# Patient Record
Sex: MALE | Race: WHITE | NOT HISPANIC OR LATINO
[De-identification: names, ages, dates, MRNs, and addresses within clinical notes are randomized per-mention and may not be internally consistent; named-entity substitution may affect disease eponyms.]

---

## 2019-03-21 ENCOUNTER — APPOINTMENT (OUTPATIENT)
Dept: PULMONOLOGY | Facility: CLINIC | Age: 84
End: 2019-03-21
Payer: MEDICARE

## 2019-03-21 VITALS
SYSTOLIC BLOOD PRESSURE: 120 MMHG | WEIGHT: 151 LBS | RESPIRATION RATE: 17 BRPM | HEART RATE: 70 BPM | BODY MASS INDEX: 23.7 KG/M2 | HEIGHT: 67 IN | OXYGEN SATURATION: 98 % | DIASTOLIC BLOOD PRESSURE: 83 MMHG

## 2019-03-21 DIAGNOSIS — R06.02 SHORTNESS OF BREATH: ICD-10-CM

## 2019-03-21 DIAGNOSIS — Z01.811 ENCOUNTER FOR PREPROCEDURAL RESPIRATORY EXAMINATION: ICD-10-CM

## 2019-03-21 DIAGNOSIS — R93.89 ABNORMAL FINDINGS ON DIAGNOSTIC IMAGING OF OTHER SPECIFIED BODY STRUCTURES: ICD-10-CM

## 2019-03-21 PROCEDURE — 71046 X-RAY EXAM CHEST 2 VIEWS: CPT

## 2019-03-21 PROCEDURE — 94060 EVALUATION OF WHEEZING: CPT | Mod: 59

## 2019-03-21 PROCEDURE — 94729 DIFFUSING CAPACITY: CPT

## 2019-03-21 PROCEDURE — ZZZZZ: CPT

## 2019-03-21 PROCEDURE — 94618 PULMONARY STRESS TESTING: CPT

## 2019-03-21 PROCEDURE — 94727 GAS DIL/WSHOT DETER LNG VOL: CPT

## 2019-03-21 PROCEDURE — 99204 OFFICE O/P NEW MOD 45 MIN: CPT | Mod: 25

## 2019-03-21 NOTE — REASON FOR VISIT
[Initial Evaluation] : an initial evaluation [Family Member] : family member [FreeTextEntry1] : initial pulmonary evaluation

## 2019-03-21 NOTE — PHYSICAL EXAM
[Normal Appearance] : normal appearance [General Appearance - Well Developed] : well developed [Well Groomed] : well groomed [General Appearance - Well Nourished] : well nourished [No Deformities] : no deformities [General Appearance - In No Acute Distress] : no acute distress [Normal Conjunctiva] : the conjunctiva exhibited no abnormalities [Eyelids - No Xanthelasma] : the eyelids demonstrated no xanthelasmas [Neck Appearance] : the appearance of the neck was normal [Normal Oropharynx] : normal oropharynx [Neck Cervical Mass (___cm)] : no neck mass was observed [Jugular Venous Distention Increased] : there was no jugular-venous distention [Thyroid Diffuse Enlargement] : the thyroid was not enlarged [Heart Rate And Rhythm] : heart rate and rhythm were normal [Thyroid Nodule] : there were no palpable thyroid nodules [Heart Sounds] : normal S1 and S2 [Murmurs] : no murmurs present [Respiration, Rhythm And Depth] : normal respiratory rhythm and effort [Exaggerated Use Of Accessory Muscles For Inspiration] : no accessory muscle use [Auscultation Breath Sounds / Voice Sounds] : lungs were clear to auscultation bilaterally [Abdomen Soft] : soft [Abdomen Tenderness] : non-tender [Abdomen Mass (___ Cm)] : no abdominal mass palpated [Abnormal Walk] : normal gait [Gait - Sufficient For Exercise Testing] : the gait was sufficient for exercise testing [Nail Clubbing] : no clubbing of the fingernails [Cyanosis, Localized] : no localized cyanosis [Petechial Hemorrhages (___cm)] : no petechial hemorrhages [Skin Color & Pigmentation] : normal skin color and pigmentation [Skin Turgor] : normal skin turgor [] : no rash [Deep Tendon Reflexes (DTR)] : deep tendon reflexes were 2+ and symmetric [No Focal Deficits] : no focal deficits [Sensation] : the sensory exam was normal to light touch and pinprick [Oriented To Time, Place, And Person] : oriented to person, place, and time [Impaired Insight] : insight and judgment were intact [Affect] : the affect was normal [II] : II [FreeTextEntry1] : I:E ratio 1:3; clear

## 2019-03-21 NOTE — PROCEDURE
[FreeTextEntry1] : Full PFT revealed mild obstructive dysfunction, normal flows, with a FEV1 of 2.92 ,which is 109% of predicted, with 11% improvement with use of bronchodilator, normal lung volumes, and a normal diffusion of 22.7, which is 149% of predicted with a normal flow volume loop \par \par 6 minute walk test reveals a low oxygen saturation of 95 % with no evidence of dyspnea or fatigue; He walked 391.2 meters\par \par CXR reveals a normal sized heart; no evidence of infiltrate or effusion--a normal appearing chest radiograph\par  \par Chest CT (3.16.19) revealed no acute PE. Spiculated left upper lobe mass is noted measuring 2.3 x 2.4 cm on image 46 in the left upper lobe which is most concerning for cancer. in addition there are multiple liver lesions identified with upper abdominal lymphadenopathy and what appears to be a pancreatic lesion. Rule out malignancy \par \par CXR reveals a normal sized heart; no evidence of infiltrate or effusion-- LIZZIE approx. 2 cm nodule \par \par CBC :\par WBC 8.01\par HgB 11.2\par HCT 35.8\par \par CMP alkaline phosphatase 5.3\par albumin 4.1\par SGPT 62\par SGOT 45\par D-Dimer 1.64

## 2019-03-21 NOTE — ADDENDUM
[FreeTextEntry1] : All medical record entries made by ashlyn Mcduffie were at Dr. Shon Allen's, direction and personally dictated by me on 03/21/2019. I have reviewed the chart and agree that the record accurately reflects my personal performance of the history, physical exam, assessment and plan. I have also personally directed, reviewed, and agree with the discharge instructions.

## 2019-03-21 NOTE — HISTORY OF PRESENT ILLNESS
[FreeTextEntry1] : Mr. Ruff is a 83 year old male presenting to the office today for an initial pulmonary evaluation. His chief complaint is chest pain / abnormal chest CT. \par -He states that he has generally been feeling well until about 6 days ago on Saturday (3/16) when he began to have a left chest pain\par -he was taken to [facility] where he had a CXR and Chest Ct, revealing a left upper lung abnormality \par -he reports that he has been having difficulty sleeping, as he wakes up about once every hour to urinate\par -he notes that he does not typically snore\par -his memory and concentration are good\par -he states that he would not be able to fall asleep while watching a boring TV show, or as a passenger in a car for over one hour \par -he states that his bowels have been regular\par -he reports that his sense of smell has been good. he adds that his sense of taste is poor, and he has had no interest in eating. he has lost about 10 pounds in the past month\par -he reports that he has some occasional coughing, and last week during the episode of his chest pain he was having some shortness of breath. \par -he is currently having some back and side pains\par -he notes that his sinuses are generally under control\par -he reports that he has had pain in his scrotum for about 6-12 months. his PCP told him that it was just age related, and to take Advil\par -he states that he can still feel some chest pain when he takes a deep breath\par -he denies any headaches, nausea, vomiting, fever, chills, sweats, chest pressure, diarrhea, constipation, dysphagia, dizziness, leg swelling, leg pain, itchy eyes, itchy ears, heartburn, reflux, or sour taste in the mouth, hoarseness.

## 2019-03-21 NOTE — ASSESSMENT
[FreeTextEntry1] : Mr. WYNN is a 83 year old male with a history of BPH, renal insufficiency, occupational exposure at workplace who comes into today for pulmonary evaluation following hospitalization at [facility] and abnormality in chest CT.\par \par Chest  pain is most consistently musculoskeletal issue. Most likely not cardiac or pulmonary related. \par \par problem 1: abnormal chest CT (3 abnormalities)\par 1. LIZZIE nodule\par 2. liver "spots" \par 3. ?lymph nodes in abdomen (coupled with elevated LFTs)\par \par -likely c/w malignant Lung process\par -PET CT pending (will use PET CT as roadmap for next steps)\par -get Thoracic Surgery evaluation : Dr. Curt Conroy (3.25.19) \par \par -CAT scans are the only radiological modality to identify abnormalities w/in the lungs with regards to nodules/masses/lymph nodes. Risks, benefits were reviewed in detail. The guidelines for abnormalities include follow up CT scans at various intervals which could range from 6 weeks to 1 year intervals. If there is a change for the worse then consideration for a biopsy will be considered if you are a candidate. Second opinion evaluation with a thoracic surgeon or an interventional radiologist could be offered. \par \par problem 2: preoperative pulmonary clearance\par -at this point in time there are no absolute pulmonary contraindications to go forward with the planned procedure \par -at the time of surgery s/he should have optimal pain control, incentive spirometry, early ambulation, DVT and GI prophylaxis. \par \par problem 3: poor sleep\par -no pulmonary related therapy at current time\par \par problem 4: health maintenance \par -recommended yearly flu shot - 2018\par -recommended strep pneumonia vaccines: Prevnar-13 vaccine, followed by Pneumo vaccine 23 one year following\par -recommended early intervention for Upper Respiratory Infections (URIs)\par -recommended regular osteoporosis evaluations\par -recommended early dermatological evaluations\par -recommended after the age of 50 to the age of 70, colonoscopy every 5 years \par \par F/U in 6-8 weeks \par He She is encouraged to call with any changes, concerns, or questions

## 2019-03-23 ENCOUNTER — FORM ENCOUNTER (OUTPATIENT)
Age: 84
End: 2019-03-23

## 2019-03-24 ENCOUNTER — APPOINTMENT (OUTPATIENT)
Dept: NUCLEAR MEDICINE | Facility: CLINIC | Age: 84
End: 2019-03-24
Payer: MEDICARE

## 2019-03-24 ENCOUNTER — OUTPATIENT (OUTPATIENT)
Dept: OUTPATIENT SERVICES | Facility: HOSPITAL | Age: 84
LOS: 1 days | End: 2019-03-24
Payer: COMMERCIAL

## 2019-03-24 DIAGNOSIS — R93.89 ABNORMAL FINDINGS ON DIAGNOSTIC IMAGING OF OTHER SPECIFIED BODY STRUCTURES: ICD-10-CM

## 2019-03-24 PROCEDURE — 78815 PET IMAGE W/CT SKULL-THIGH: CPT | Mod: 26,PI

## 2019-03-24 PROCEDURE — 78815 PET IMAGE W/CT SKULL-THIGH: CPT

## 2019-03-24 PROCEDURE — A9552: CPT

## 2019-03-27 ENCOUNTER — APPOINTMENT (OUTPATIENT)
Dept: THORACIC SURGERY | Facility: CLINIC | Age: 84
End: 2019-03-27
Payer: MEDICARE

## 2019-03-27 VITALS
DIASTOLIC BLOOD PRESSURE: 72 MMHG | SYSTOLIC BLOOD PRESSURE: 128 MMHG | HEIGHT: 67 IN | BODY MASS INDEX: 22.76 KG/M2 | RESPIRATION RATE: 14 BRPM | OXYGEN SATURATION: 98 % | WEIGHT: 145 LBS | HEART RATE: 77 BPM

## 2019-03-27 DIAGNOSIS — K76.9 LIVER DISEASE, UNSPECIFIED: ICD-10-CM

## 2019-03-27 PROCEDURE — 99205 OFFICE O/P NEW HI 60 MIN: CPT

## 2019-03-30 LAB — APTT BLD: 29.7 SEC

## 2019-04-02 NOTE — CONSULT LETTER
[Consult Letter:] : I had the pleasure of evaluating your patient, [unfilled]. [Please see my note below.] : Please see my note below. [Consult Closing:] : Thank you very much for allowing me to participate in the care of this patient.  If you have any questions, please do not hesitate to contact me. [Sincerely,] : Sincerely, [FreeTextEntry2] : Dr. Allen (Pulm/ref) [FreeTextEntry3] : \par \par \par Curt Conroy MD\par Attending Surgeon\par Division of Thoracic Surgery\par , Cardiovascular and Thoracic Surgery\par [facility]

## 2019-04-02 NOTE — HISTORY OF PRESENT ILLNESS
[FreeTextEntry1] : 83 year old male, presents for evaluation of lung nodule, referred by Dr. Allen.  \par \par CT Chest on:\par (no report)\par \par PET/CT on 3/24/19: FDG avid LIZZIE lesion with multiple liver and bone lesions, pancreatic lesion.\par - \par \par PFTs on 3/21/19:\par - FVC: 4.12 (115%)\par - FEV1: 3.25 (120%)\par - DLCO: 22.7 (147%)

## 2019-04-02 NOTE — PHYSICAL EXAM
[General Appearance - Alert] : alert [General Appearance - In No Acute Distress] : in no acute distress [Sclera] : the sclera and conjunctiva were normal [Outer Ear] : the ears and nose were normal in appearance [Neck Appearance] : the appearance of the neck was normal [] : the neck was supple [Auscultation Breath Sounds / Voice Sounds] : lungs were clear to auscultation bilaterally [Heart Rate And Rhythm] : heart rate was normal and rhythm regular [Examination Of The Chest] : the chest was normal in appearance [Edema] : there was no peripheral edema [Bowel Sounds] : normal bowel sounds [Abdomen Soft] : soft [Cervical Lymph Nodes Enlarged Posterior Bilaterally] : posterior cervical [Cervical Lymph Nodes Enlarged Anterior Bilaterally] : anterior cervical [Supraclavicular Lymph Nodes Enlarged Bilaterally] : supraclavicular [No CVA Tenderness] : no ~M costovertebral angle tenderness [No Spinal Tenderness] : no spinal tenderness [Nail Clubbing] : no clubbing  or cyanosis of the fingernails [Skin Color & Pigmentation] : normal skin color and pigmentation [No Focal Deficits] : no focal deficits [Oriented To Time, Place, And Person] : oriented to person, place, and time

## 2019-04-02 NOTE — REVIEW OF SYSTEMS
[Feeling Tired] : feeling tired [Recent Weight Gain (___ Lbs)] : recent [unfilled] ~Ulb weight gain [Negative] : Heme/Lymph [de-identified] : back pain

## 2019-04-02 NOTE — ASSESSMENT
[FreeTextEntry1] : 83 year old male found to have hypermetabolic RUL lesion and Pancreatic lesion with multiple FDG avid liver and bone lesions c/w metastatic disease of pulmonary or pancreatic origin. The lesions in the liver are amenable to percutaneous biopsy and I feel this is the best way to obtain tissue. i have set the patient up for a liver biopsy and have asked him to follow up with me once it is obtained.

## 2019-04-04 ENCOUNTER — FORM ENCOUNTER (OUTPATIENT)
Age: 84
End: 2019-04-04

## 2019-04-05 ENCOUNTER — APPOINTMENT (OUTPATIENT)
Dept: ULTRASOUND IMAGING | Facility: IMAGING CENTER | Age: 84
End: 2019-04-05
Payer: MEDICARE

## 2019-04-05 ENCOUNTER — RESULT REVIEW (OUTPATIENT)
Age: 84
End: 2019-04-05

## 2019-04-05 ENCOUNTER — APPOINTMENT (OUTPATIENT)
Dept: HEMATOLOGY ONCOLOGY | Facility: CLINIC | Age: 84
End: 2019-04-05
Payer: COMMERCIAL

## 2019-04-05 ENCOUNTER — OUTPATIENT (OUTPATIENT)
Dept: OUTPATIENT SERVICES | Facility: HOSPITAL | Age: 84
LOS: 1 days | Discharge: HOME | End: 2019-04-05

## 2019-04-05 ENCOUNTER — OUTPATIENT (OUTPATIENT)
Dept: OUTPATIENT SERVICES | Facility: HOSPITAL | Age: 84
LOS: 1 days | End: 2019-04-05
Payer: COMMERCIAL

## 2019-04-05 VITALS
SYSTOLIC BLOOD PRESSURE: 153 MMHG | DIASTOLIC BLOOD PRESSURE: 80 MMHG | RESPIRATION RATE: 16 BRPM | BODY MASS INDEX: 23.03 KG/M2 | TEMPERATURE: 98.2 F | OXYGEN SATURATION: 98 % | WEIGHT: 151.99 LBS | HEART RATE: 82 BPM | HEIGHT: 68 IN

## 2019-04-05 DIAGNOSIS — C25.9 MALIGNANT NEOPLASM OF PANCREAS, UNSPECIFIED: ICD-10-CM

## 2019-04-05 DIAGNOSIS — Z57.9 OCCUPATIONAL EXPOSURE TO UNSPECIFIED RISK FACTOR: ICD-10-CM

## 2019-04-05 DIAGNOSIS — M54.6 PAIN IN THORACIC SPINE: ICD-10-CM

## 2019-04-05 DIAGNOSIS — C34.90 MALIGNANT NEOPLASM OF UNSPECIFIED PART OF UNSPECIFIED BRONCHUS OR LUNG: ICD-10-CM

## 2019-04-05 DIAGNOSIS — R22.2 LOCALIZED SWELLING, MASS AND LUMP, TRUNK: ICD-10-CM

## 2019-04-05 DIAGNOSIS — Z78.9 OTHER SPECIFIED HEALTH STATUS: ICD-10-CM

## 2019-04-05 DIAGNOSIS — Z51.5 ENCOUNTER FOR PALLIATIVE CARE: ICD-10-CM

## 2019-04-05 DIAGNOSIS — K76.9 LIVER DISEASE, UNSPECIFIED: ICD-10-CM

## 2019-04-05 DIAGNOSIS — G89.3 NEOPLASM RELATED PAIN (ACUTE) (CHRONIC): ICD-10-CM

## 2019-04-05 LAB
BASOPHILS NFR BLD AUTO: 1 % — SIGNIFICANT CHANGE UP (ref 0–2)
EOSINOPHIL NFR BLD AUTO: 1 % — SIGNIFICANT CHANGE UP (ref 0–6)
HCT VFR BLD CALC: 36.6 % — LOW (ref 39–50)
HGB BLD-MCNC: 12.4 G/DL — LOW (ref 13–17)
LYMPHOCYTES # BLD AUTO: 23 % — SIGNIFICANT CHANGE UP (ref 13–44)
MCHC RBC-ENTMCNC: 31.6 PG — SIGNIFICANT CHANGE UP (ref 27–34)
MCHC RBC-ENTMCNC: 33.9 G/DL — SIGNIFICANT CHANGE UP (ref 32–36)
MCV RBC AUTO: 93.2 FL — SIGNIFICANT CHANGE UP (ref 80–100)
MONOCYTES NFR BLD AUTO: 14 % — SIGNIFICANT CHANGE UP (ref 2–14)
NEUTROPHILS # BLD AUTO: 7.5 K/UL — HIGH (ref 1.8–7.4)
NEUTROPHILS NFR BLD AUTO: 61 % — SIGNIFICANT CHANGE UP (ref 43–77)
PLAT MORPH BLD: NORMAL — SIGNIFICANT CHANGE UP
PLATELET # BLD AUTO: 296 K/UL — SIGNIFICANT CHANGE UP (ref 150–400)
RBC # BLD: 3.92 M/UL — LOW (ref 4.2–5.8)
RBC # FLD: 13.6 % — SIGNIFICANT CHANGE UP (ref 10.3–14.5)
RBC BLD AUTO: NORMAL — SIGNIFICANT CHANGE UP
WBC # BLD: 11.5 K/UL — HIGH (ref 3.8–10.5)
WBC # FLD AUTO: 11.5 K/UL — HIGH (ref 3.8–10.5)

## 2019-04-05 PROCEDURE — 76942 ECHO GUIDE FOR BIOPSY: CPT

## 2019-04-05 PROCEDURE — 99205 OFFICE O/P NEW HI 60 MIN: CPT

## 2019-04-05 PROCEDURE — 99203 OFFICE O/P NEW LOW 30 MIN: CPT

## 2019-04-05 PROCEDURE — 47000 NEEDLE BIOPSY OF LIVER PERQ: CPT

## 2019-04-05 PROCEDURE — 76942 ECHO GUIDE FOR BIOPSY: CPT | Mod: 26

## 2019-04-05 RX ORDER — CIPROFLOXACIN HYDROCHLORIDE 500 MG/1
500 TABLET, FILM COATED ORAL
Qty: 10 | Refills: 0 | Status: DISCONTINUED | COMMUNITY
Start: 2019-03-01 | End: 2019-04-05

## 2019-04-05 RX ORDER — CEFPODOXIME PROXETIL 200 MG/1
200 TABLET, FILM COATED ORAL
Qty: 10 | Refills: 0 | Status: DISCONTINUED | COMMUNITY
Start: 2019-03-01 | End: 2019-04-05

## 2019-04-05 RX ORDER — TRIAMCINOLONE ACETONIDE 1 MG/G
0.1 OINTMENT TOPICAL
Qty: 80 | Refills: 0 | Status: DISCONTINUED | COMMUNITY
Start: 2019-03-01 | End: 2019-04-05

## 2019-04-05 RX ORDER — DOCUSATE SODIUM 100 MG/1
100 CAPSULE ORAL
Qty: 90 | Refills: 2 | Status: ACTIVE | COMMUNITY
Start: 2019-04-05 | End: 1900-01-01

## 2019-04-05 RX ORDER — DEXAMETHASONE 4 MG/1
4 TABLET ORAL
Qty: 45 | Refills: 2 | Status: ACTIVE | COMMUNITY
Start: 2019-04-05 | End: 1900-01-01

## 2019-04-05 RX ORDER — OXYCODONE 5 MG/1
5 TABLET ORAL
Qty: 90 | Refills: 0 | Status: ACTIVE | COMMUNITY
Start: 2019-04-05 | End: 1900-01-01

## 2019-04-05 RX ORDER — SENNOSIDES 8.6 MG/1
8.6 CAPSULE, GELATIN COATED ORAL
Qty: 60 | Refills: 3 | Status: ACTIVE | COMMUNITY
Start: 2019-04-05 | End: 1900-01-01

## 2019-04-05 SDOH — HEALTH STABILITY - PHYSICAL HEALTH: OCCUPATIONAL EXPOSURE TO UNSPECIFIED RISK FACTOR: Z57.9

## 2019-04-05 NOTE — REVIEW OF SYSTEMS
[Night Sweats] : night sweats [Recent Change In Weight] : ~T recent weight change [Negative] : Allergic/Immunologic [FreeTextEntry2] : lost 10 pounds. Still working. Appetite is decreased - food tastes poor. [FreeTextEntry9] : back pain that radiates to bilateral flanks, worse when lies down, better when walks, not worse with eating.

## 2019-04-05 NOTE — CONSULT LETTER
[Dear  ___] : Dear  [unfilled], [Consult Letter:] : I had the pleasure of evaluating your patient, [unfilled]. [Please see my note below.] : Please see my note below. [Consult Closing:] : Thank you very much for allowing me to participate in the care of this patient.  If you have any questions, please do not hesitate to contact me. [Sincerely,] : Sincerely, [DrMary  ___] : Dr. ADORNO [DrMary ___] : Dr. ADORNO

## 2019-04-05 NOTE — PHYSICAL EXAM
[Restricted in physically strenuous activity but ambulatory and able to carry out work of a light or sedentary nature] : Status 1- Restricted in physically strenuous activity but ambulatory and able to carry out work of a light or sedentary nature, e.g., light house work, office work [Normal] : affect appropriate [de-identified] : appears slow to move due to back pain.  [de-identified] : tender liver edge at 2-3 fingerbreadths [de-identified] : 1 + bilateral ankle edema. [de-identified] : pain on extension. No spinal tenderness to palpation or percussion.

## 2019-04-05 NOTE — HISTORY OF PRESENT ILLNESS
[de-identified] : Mr Ruff is an 83 year old gentlemen with past medical history of BPH, renal insufficiency presenting to the office for an initial consultation of probable Pancreatic CA.\par \par Patient had back pains intermittently since 10/2018 and worse when sleeping. Advil helped it at that time, but when it wore off it came back. This was a new pain.\par Had physical with PCP in 1/2019, and told PCP that he felt OK while on Advil. \par Patient choses not to return to this doctor.\par \par On 3/16/2019 pain was worse, and patient was taken to [facility]  where had CXR and chest CT angiogram revealing LIZZIE abnormality, pancreas lesion, and no evidence of PE. \par He was referred to Dr Shon Allen office on 3/21/2019\par \par PET/CT was recommended by his Pulmonologist, Dr Shon Allen\par PET/CT 3/24/2019: FDG-avid spiculated left upper lobe pulmonary nodule, suspicious for malignancy.  Innumerable FDG-avid bilobar hepatic lesions, suspicious for metastases. \par Few FDG avid metastatic lesions in the bone/bone marrow. Centrally photopenic peripherally FDG avid difficult to delineate pancreatic lesion. Multiple FDG-avid difficult to delineate metastatic peripancreatic, mesenteric and retroperitoneal lymph nodes. \par \par On 3/27/2019 patient was seen by Dr Curt Conroy who recommended percutaneous biopsy of the liver.\par Biopsy was performed today (4/5/2019) under US guidance.\par \par His back pain is worse than ever. He stopped Advil due to prostate biopsy and liver biopsy. Was told to use Tylenol and it does not help. \par Patient has lost ~ 10 pounds of weight.  [de-identified] : Pulmonology: Shon Allen [phone]\par Cardiothoracic surgery: Curt Conroy [phone]\par Urology: Mo Cherry; [phone]\par \par Family:\par Patient home #[phone] (no cell)\par #1 contact - Stephanie - daughter in law ([phone])\par #2 contact - Rosetta - friend ([phone]), and her  Clifton ([phone])\par Patient is estranged from 2 other children.

## 2019-04-08 LAB
ALBUMIN SERPL ELPH-MCNC: 3.8 G/DL
ALP BLD-CCNC: 240 U/L
ALT SERPL-CCNC: 46 U/L
AMYLASE/CREAT SERPL: 40 U/L
ANION GAP SERPL CALC-SCNC: 11 MMOL/L
APTT BLD: 28.7 SEC
AST SERPL-CCNC: 50 U/L
BILIRUB SERPL-MCNC: 0.6 MG/DL
BUN SERPL-MCNC: 16 MG/DL
CALCIUM SERPL-MCNC: 9.8 MG/DL
CANCER AG19-9 SERPL-ACNC: 6116 U/ML
CEA SERPL-MCNC: 5.3 NG/ML
CHLORIDE SERPL-SCNC: 99 MMOL/L
CO2 SERPL-SCNC: 28 MMOL/L
CREAT SERPL-MCNC: 0.99 MG/DL
ESTIMATED AVERAGE GLUCOSE: 126 MG/DL
GLUCOSE SERPL-MCNC: 106 MG/DL
HBA1C MFR BLD HPLC: 6 %
HBV CORE IGG+IGM SER QL: NONREACTIVE
HBV SURFACE AB SER QL: NONREACTIVE
HBV SURFACE AG SER QL: NONREACTIVE
HCV AB SER QL: NONREACTIVE
HCV S/CO RATIO: 0.16 S/CO
INR PPP: 1.16 RATIO
LDH SERPL-CCNC: 240 U/L
LPL SERPL-CCNC: 34 U/L
POTASSIUM SERPL-SCNC: 4.9 MMOL/L
PROT SERPL-MCNC: 6.9 G/DL
PT BLD: 13.4 SEC
SODIUM SERPL-SCNC: 138 MMOL/L

## 2019-04-09 ENCOUNTER — FORM ENCOUNTER (OUTPATIENT)
Age: 84
End: 2019-04-09

## 2019-04-10 ENCOUNTER — APPOINTMENT (OUTPATIENT)
Dept: MRI IMAGING | Facility: CLINIC | Age: 84
End: 2019-04-10
Payer: COMMERCIAL

## 2019-04-10 ENCOUNTER — OUTPATIENT (OUTPATIENT)
Dept: OUTPATIENT SERVICES | Facility: HOSPITAL | Age: 84
LOS: 1 days | End: 2019-04-10
Payer: COMMERCIAL

## 2019-04-10 DIAGNOSIS — R22.2 LOCALIZED SWELLING, MASS AND LUMP, TRUNK: ICD-10-CM

## 2019-04-10 PROCEDURE — A9585: CPT

## 2019-04-10 PROCEDURE — 72157 MRI CHEST SPINE W/O & W/DYE: CPT | Mod: 26

## 2019-04-10 PROCEDURE — 72157 MRI CHEST SPINE W/O & W/DYE: CPT

## 2019-04-11 LAB — SURGICAL PATHOLOGY STUDY: SIGNIFICANT CHANGE UP

## 2019-04-12 ENCOUNTER — LABORATORY RESULT (OUTPATIENT)
Age: 84
End: 2019-04-12

## 2019-04-12 ENCOUNTER — APPOINTMENT (OUTPATIENT)
Dept: HEMATOLOGY ONCOLOGY | Facility: CLINIC | Age: 84
End: 2019-04-12
Payer: COMMERCIAL

## 2019-04-12 ENCOUNTER — RESULT REVIEW (OUTPATIENT)
Age: 84
End: 2019-04-12

## 2019-04-12 VITALS
BODY MASS INDEX: 23.46 KG/M2 | SYSTOLIC BLOOD PRESSURE: 138 MMHG | RESPIRATION RATE: 17 BRPM | DIASTOLIC BLOOD PRESSURE: 80 MMHG | OXYGEN SATURATION: 99 % | HEART RATE: 63 BPM | WEIGHT: 154.32 LBS | TEMPERATURE: 97.7 F

## 2019-04-12 DIAGNOSIS — R91.8 OTHER NONSPECIFIC ABNORMAL FINDING OF LUNG FIELD: ICD-10-CM

## 2019-04-12 DIAGNOSIS — Z72.820 SLEEP DEPRIVATION: ICD-10-CM

## 2019-04-12 DIAGNOSIS — K86.9 DISEASE OF PANCREAS, UNSPECIFIED: ICD-10-CM

## 2019-04-12 DIAGNOSIS — R97.20 ELEVATED PROSTATE, SPECIFIC ANTIGEN [PSA]: ICD-10-CM

## 2019-04-12 LAB
BASOPHILS # BLD AUTO: 0 K/UL — SIGNIFICANT CHANGE UP (ref 0–0.2)
EOSINOPHIL # BLD AUTO: 0 K/UL — SIGNIFICANT CHANGE UP (ref 0–0.5)
HCT VFR BLD CALC: 35.3 % — LOW (ref 39–50)
HGB BLD-MCNC: 12.1 G/DL — LOW (ref 13–17)
LYMPHOCYTES # BLD AUTO: 0.9 K/UL — LOW (ref 1–3.3)
LYMPHOCYTES # BLD AUTO: 10 % — LOW (ref 13–44)
MCHC RBC-ENTMCNC: 31.8 PG — SIGNIFICANT CHANGE UP (ref 27–34)
MCHC RBC-ENTMCNC: 34.3 G/DL — SIGNIFICANT CHANGE UP (ref 32–36)
MCV RBC AUTO: 92.7 FL — SIGNIFICANT CHANGE UP (ref 80–100)
MONOCYTES # BLD AUTO: 2.5 K/UL — HIGH (ref 0–0.9)
MONOCYTES NFR BLD AUTO: 15 % — HIGH (ref 2–14)
NEUTROPHILS # BLD AUTO: 13 K/UL — HIGH (ref 1.8–7.4)
NEUTROPHILS NFR BLD AUTO: 75 % — SIGNIFICANT CHANGE UP (ref 43–77)
PLAT MORPH BLD: NORMAL — SIGNIFICANT CHANGE UP
PLATELET # BLD AUTO: 293 K/UL — SIGNIFICANT CHANGE UP (ref 150–400)
RBC # BLD: 3.81 M/UL — LOW (ref 4.2–5.8)
RBC # FLD: 14.3 % — SIGNIFICANT CHANGE UP (ref 10.3–14.5)
RBC BLD AUTO: SIGNIFICANT CHANGE UP
WBC # BLD: 16.5 K/UL — HIGH (ref 3.8–10.5)
WBC # FLD AUTO: 16.5 K/UL — HIGH (ref 3.8–10.5)

## 2019-04-12 PROCEDURE — 99215 OFFICE O/P EST HI 40 MIN: CPT

## 2019-04-12 RX ORDER — ZOLPIDEM TARTRATE 5 MG/1
5 TABLET ORAL
Qty: 30 | Refills: 0 | Status: ACTIVE | COMMUNITY
Start: 2019-04-12 | End: 1900-01-01

## 2019-04-12 RX ORDER — ENOXAPARIN SODIUM 100 MG/ML
60 INJECTION SUBCUTANEOUS DAILY
Qty: 30 | Refills: 2 | Status: ACTIVE | COMMUNITY
Start: 2019-04-12 | End: 1900-01-01

## 2019-04-12 RX ORDER — LIDOCAINE AND PRILOCAINE 25; 25 MG/G; MG/G
2.5-2.5 CREAM TOPICAL
Qty: 1 | Refills: 3 | Status: ACTIVE | COMMUNITY
Start: 2019-04-12 | End: 1900-01-01

## 2019-04-12 NOTE — HISTORY OF PRESENT ILLNESS
[de-identified] : Mr Ruff is an 83 year old gentlemen with past medical history of BPH, renal insufficiency presenting to the office for an initial consultation of probable Pancreatic CA.\par \par Patient had back pains intermittently since 10/2018 and worse when sleeping. Advil helped it at that time, but when it wore off it came back. This was a new pain.\par Had physical with PCP in 1/2019, and told PCP that he felt OK while on Advil. \par Patient choses not to return to this doctor.\par \par On 3/16/2019 pain was worse, and patient was taken to [facility]  where had CXR and chest CT angiogram revealing LIZZIE abnormality, pancreas lesion, and no evidence of PE. \par He was referred to Dr Shon Allen office on 3/21/2019\par \par PET/CT was recommended by his Pulmonologist, Dr Shon Allen\par PET/CT 3/24/2019: FDG-avid spiculated left upper lobe pulmonary nodule, suspicious for malignancy.  Innumerable FDG-avid bilobar hepatic lesions, suspicious for metastases. \par Few FDG avid metastatic lesions in the bone/bone marrow. Centrally photopenic peripherally FDG avid difficult to delineate pancreatic lesion. Multiple FDG-avid difficult to delineate metastatic peripancreatic, mesenteric and retroperitoneal lymph nodes. \par \par On 3/27/2019 patient was seen by Dr Curt Conroy who recommended percutaneous biopsy of the liver.\par Biopsy was performed today (4/5/2019) under US guidance.\par \par His back pain is worse than ever. He stopped Advil due to prostate biopsy and liver biopsy. Was told to use Tylenol and it does not help. \par Patient has lost ~ 10 pounds of weight. \par \par 4/12/2019: Patient is here to today for follow-up and pathology and scans and next steps. 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 He, his son and daughter in law were present for today's discussion. Most is outlined in the assessment and planning. We reviewed his disease diagnosis, prognosis, treatments, and next steps. He also probably has a primary lung carcinoma and possibly prostate cancer. He will see the Urologist today to review the results of his biopsy. His back pain continues. He does not like the way that the oxycodone makes him feel - drowsiness, so is not using.  [de-identified] : Pulmonology: Shon Allen [phone]\par Cardiothoracic surgery: Curt Conroy [phone]\par Urology: Mo Cherry; [phone]\par \par Family:\par Patient home #[phone] (no cell)\par #1 contact - Rick - daughter in law (House [phone]; Cell [phone])\par #2 contact - Rosetta - friend ([phone]), and her  Clifton ([phone])\par Patient is estranged from 2 other children.

## 2019-04-12 NOTE — PHYSICAL EXAM
[Restricted in physically strenuous activity but ambulatory and able to carry out work of a light or sedentary nature] : Status 1- Restricted in physically strenuous activity but ambulatory and able to carry out work of a light or sedentary nature, e.g., light house work, office work [Thin] : thin [Normal] : full range of motion and no deformities appreciated [de-identified] : 1 + bilateral ankle edema. [de-identified] : tender liver edge at 2-3 fingerbreadths [de-identified] : no spinal tenderness noted.

## 2019-04-17 ENCOUNTER — OTHER (OUTPATIENT)
Age: 84
End: 2019-04-17

## 2019-04-23 ENCOUNTER — APPOINTMENT (OUTPATIENT)
Dept: HEMATOLOGY ONCOLOGY | Facility: CLINIC | Age: 84
End: 2019-04-23

## 2019-04-23 PROBLEM — Z51.5 ENCOUNTER FOR PALLIATIVE CARE: Status: ACTIVE | Noted: 2019-04-23

## 2019-04-23 PROBLEM — C25.9 ADENOCARCINOMA OF PANCREAS: Status: ACTIVE | Noted: 2019-04-12

## 2019-04-23 PROBLEM — G89.3 PAIN, NEOPLASM-RELATED: Status: ACTIVE | Noted: 2019-04-23

## 2019-04-23 NOTE — PHYSICAL EXAM
[General Appearance - Alert] : alert [Sclera] : the sclera and conjunctiva were normal [Normal Oral Mucosa] : normal oral mucosa [Neck Appearance] : the appearance of the neck was normal [Auscultation Breath Sounds / Voice Sounds] : lungs were clear to auscultation bilaterally [FreeTextEntry1] : noticeable discomfort from back pain [Heart Rate And Rhythm] : heart rate was normal and rhythm regular [Heart Sounds] : normal S1 and S2 [Edema] : there was no peripheral edema [Bowel Sounds] : normal bowel sounds [Abdomen Soft] : soft [Abdomen Tenderness] : non-tender [Skin Color & Pigmentation] : normal skin color and pigmentation [No Focal Deficits] : no focal deficits

## 2019-04-23 NOTE — ASSESSMENT
[FreeTextEntry1] : 83yoM with:\par \par 1. Metastatic malignancy - S/p liver bx today.  Heme Onc follow up in 1 week to follow up on results and discuss treatment plan. \par \par 2. Malignancy-related pain - Start Oxycodone 2.5mg - 5mg Q4h PRN. Dexamethasone taper prescribed. \par \par 3. Constipation ppx - Start Senna 2 capsules QHS\par \par 4. Encounter for Palliative Care - No HCP yet on file; discussed importance of having one on file. \par \par RTO 1 week, call sooner with any questions/issues

## 2019-04-23 NOTE — DATA REVIEWED
[FreeTextEntry1] : PET/CT (3/25/19) - IMPRESSION: FDG-PET/CT scan: \par 1. FDG-avid spiculated left upper lobe pulmonary nodule, suspicious for malignancy. \par 2. Innumerable FDG-avid bilobar hepatic lesions, suspicious for metastases. \par 3. Few FDG avid metastatic lesions in the bone/bone marrow. \par 4. Centrally photopenic peripherally FDG avid difficult to delineate pancreatic lesion. \par 5. Multiple FDG-avid difficult to delineate metastatic peripancreatic, mesenteric and retroperitoneal lymph nodes.

## 2019-04-23 NOTE — HISTORY OF PRESENT ILLNESS
[FreeTextEntry1] : 83yoM being seen for abbreviated initial visit in conjunction with Oncology.  \par \par Since 10/2018 pt has been experiencing abdominal pain with radiation bilaterally to the back.  The pain is worse with extension of the back, lying down, changing of positions (e.g., sitting to standing). The pain is better when sitting still.  Has been using Advil on and off as Tylenol has not been helpful.  Recently had to stop Advil for liver biopsies and Tylenol alone has not been sufficient at relieving his pain. Recent PET/CT showed a T9 lesion which is the likely etiology of his back pain. \par \par On 3/27/2019 patient was seen by Dr Curt Conroy who recommended percutaneous biopsy of the liver.\par Biopsy was performed today (4/5/2019) under US guidance.\par \par ROS:\par +low appetite\par +10lb weight loss \par +nausea/dry heaves for past two days\par \par Patient lives alone, works as an .  His next of kin is his daughter in law, Stephanie. Has friends who are involved and supportive. \par \par Patient home #[phone] (no cell)\par #1 contact - Stephanie - daughter in law ([phone])\par #2 contact - Rosetta - friend ([phone]), and her  Clifton ([phone])\par Patient is estranged from 2 other children. \par \par [employer] Ref#: [id number]

## 2019-04-25 ENCOUNTER — APPOINTMENT (OUTPATIENT)
Dept: HEMATOLOGY ONCOLOGY | Facility: CLINIC | Age: 84
End: 2019-04-25

## 2019-05-03 ENCOUNTER — APPOINTMENT (OUTPATIENT)
Dept: HEMATOLOGY ONCOLOGY | Facility: CLINIC | Age: 84
End: 2019-05-03

## 2019-05-09 ENCOUNTER — APPOINTMENT (OUTPATIENT)
Dept: PULMONOLOGY | Facility: CLINIC | Age: 84
End: 2019-05-09